# Patient Record
Sex: FEMALE | URBAN - METROPOLITAN AREA
[De-identification: names, ages, dates, MRNs, and addresses within clinical notes are randomized per-mention and may not be internally consistent; named-entity substitution may affect disease eponyms.]

---

## 2024-03-27 ENCOUNTER — APPOINTMENT (RX ONLY)
Dept: URBAN - METROPOLITAN AREA CLINIC 41 | Facility: CLINIC | Age: 29
Setting detail: DERMATOLOGY
End: 2024-03-27

## 2024-03-27 DIAGNOSIS — L74.51 PRIMARY FOCAL HYPERHIDROSIS: ICD-10-CM | Status: INADEQUATELY CONTROLLED

## 2024-03-27 DIAGNOSIS — Z41.9 ENCOUNTER FOR PROCEDURE FOR PURPOSES OTHER THAN REMEDYING HEALTH STATE, UNSPECIFIED: ICD-10-CM

## 2024-03-27 PROBLEM — L74.510 PRIMARY FOCAL HYPERHIDROSIS, AXILLA: Status: ACTIVE | Noted: 2024-03-27

## 2024-03-27 PROCEDURE — ? HYALURONIDASE INJECTION

## 2024-03-27 PROCEDURE — ? PRESCRIPTION MEDICATION MANAGEMENT

## 2024-03-27 PROCEDURE — ? BOTOX HYPERHIDROSIS

## 2024-03-27 PROCEDURE — ? COUNSELING

## 2024-03-27 PROCEDURE — ? ADDITIONAL NOTES

## 2024-03-27 PROCEDURE — ? COSMETIC CONSULTATION: FILLERS

## 2024-03-27 ASSESSMENT — LOCATION DETAILED DESCRIPTION DERM
LOCATION DETAILED: LEFT SUPERIOR VERMILION LIP
LOCATION DETAILED: LEFT AXILLARY VAULT
LOCATION DETAILED: RIGHT AXILLARY VAULT
LOCATION DETAILED: RIGHT SUPERIOR VERMILION LIP

## 2024-03-27 ASSESSMENT — LOCATION SIMPLE DESCRIPTION DERM
LOCATION SIMPLE: LEFT LIP
LOCATION SIMPLE: RIGHT AXILLARY VAULT
LOCATION SIMPLE: RIGHT LIP
LOCATION SIMPLE: LEFT AXILLARY VAULT

## 2024-03-27 ASSESSMENT — LOCATION ZONE DERM
LOCATION ZONE: AXILLAE
LOCATION ZONE: LIP

## 2024-03-27 NOTE — PROCEDURE: PRESCRIPTION MEDICATION MANAGEMENT
Samples Given: Qbrexa
Detail Level: Zone
Initiate Treatment: Qbrexa wipes
Render In Strict Bullet Format?: No
Plan: Try to get ins to cover Botox in future \\nConsider miradry in future

## 2024-03-27 NOTE — PROCEDURE: ADDITIONAL NOTES
Additional Notes: EM counsels that previous lip filler pt had done in the past was done incorrectly. EM notes that the previous tx has her lips looking crooked in comparison. EM counsels that pt should have excess filler injected with hyaluronidase and fixed before proceeding with tx. Pt agrees and would like the excess filler to be removed. EM counsels that hyaluronidase will take one week to set in. Pt will RTO in one week for further evaluation.
Detail Level: Detailed
Render Risk Assessment In Note?: no

## 2024-03-27 NOTE — PROCEDURE: HYALURONIDASE INJECTION
Administered By (Optional): Divya Garber
Detail Level: Detailed
Expiration Date (Optional): 09/2025
Total Volume (Ccs): 0.2
Lot # (Optional): [id number]
Hyaluronidase Preparation: hyaluronidase
Consent: The risks of contour defects and dimpling of the skin were reviewed with the patient prior to the injection.
Price (Use Numbers Only, No Special Characters Or $): 200
Treatment Number (Optional): 1

## 2024-03-27 NOTE — PROCEDURE: BOTOX HYPERHIDROSIS
Palms Units: 0
Topical Anesthesia: Ice
Expiration Date (Month Year): 03/2026
Dilution (U/0.1 Cc): 2
Post-Care Instructions: Patient instructed to not lie down for 4 hours and limit physical activity for 24 hours.
Consent: Written consent obtained. Risks include but not limited to muscle weakness, bruising, swelling, temporary effect, incomplete chemical denervation of sweat glands.
Axilla Units: 100
Lot #: [id number]
Detail Level: Detailed
Price (Use Numbers Only, No Special Characters Or $): 1200

## 2024-03-27 NOTE — HPI: SWEATING (HYPERHIDROSIS)
Is This A New Presentation, Or A Follow-Up?: Sweating
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 Patient saw a doctor once years ago and was given topical (possibly Drysol) which did not help.

## 2024-03-27 NOTE — PROCEDURE: COUNSELING
Medical Necessity Information: LCD Guidelines vary from payer to payer. Please check with your payer's policy to determine medical necessity.
Patient Specific Counseling (Will Not Stick From Patient To Patient): —\\nDK explains to patient that for hyperhidrosis under arms there are the most treatment options. DOMINGO explains these options include topicals, orals, Botox or miradry. DK explains the side effects and risks of each options. Patient notes she has tried something like Drysol in the past but this didn’t help.\\n\\nPatient is very eager to get this taken care of as soon as possible and not sure if she wants to wait for insurance approval. DOMINGO explains to patient that out of pocket the injections would be $1200. Patient opts for doing Botox today. DOMINGO counsels patient and makes sure understands this is temporary and we could just try topicals for now and wait until we get a response from insurance. DOMINGO makes sure patient is confident in her decision to do this today and patient says she is.
Detail Level: Detailed
Medical Necessity Clause: Botulinum toxin hyperhidrosis therapy is medically necessary because

## 2024-04-01 ENCOUNTER — APPOINTMENT (RX ONLY)
Dept: URBAN - METROPOLITAN AREA CLINIC 41 | Facility: CLINIC | Age: 29
Setting detail: DERMATOLOGY
End: 2024-04-01

## 2024-04-01 DIAGNOSIS — Z41.9 ENCOUNTER FOR PROCEDURE FOR PURPOSES OTHER THAN REMEDYING HEALTH STATE, UNSPECIFIED: ICD-10-CM

## 2024-04-01 PROCEDURE — ? FILLERS

## 2024-04-01 ASSESSMENT — LOCATION SIMPLE DESCRIPTION DERM
LOCATION SIMPLE: RIGHT LOWER LIP
LOCATION SIMPLE: LEFT LIP

## 2024-04-01 ASSESSMENT — LOCATION DETAILED DESCRIPTION DERM
LOCATION DETAILED: LEFT ORAL COMMISSURE
LOCATION DETAILED: RIGHT INFERIOR VERMILION BORDER
LOCATION DETAILED: LEFT SUPERIOR VERMILION LIP

## 2024-04-01 ASSESSMENT — LOCATION ZONE DERM: LOCATION ZONE: LIP

## 2024-04-01 NOTE — PROCEDURE: FILLERS
Additional Area 3 Volume In Cc: 0
Use Map Statement For Sites (Optional): No
Radiesse+ Syringe Price (Per 1.5 Cc- Numbers Only No Special Characters Or $): 0.00
Lot #: [id number]
Detail Level: Detailed
Pricing Information: This plan will add up the cumulative amount of filler you document and will bill based on the unit price noted below. For example if you inject 0.9 ccs of Restylane it will bill for one unit. If you inject 1.3 ccs it will bill for two units.
Filler: Juvederm Ultra XC
Expiration Date (Month Year): 08/31/2023
Additional Area 1 Location: chin
Post-Care Instructions: Patient instructed to apply ice to reduce swelling.
Lot #: [id number]
Pre-Treatment: Skin was cleaned with alcohol prior to injections.
Additional Anesthesia Volume In Cc: 6
Consent: Written consent obtained. Risks include but not limited to bruising, beading, irregular texture, ulceration, infection, allergic reaction, scar formation, incomplete augmentation, temporary nature, procedural pain.
Expiration Date (Month Year): 08/26/2024
Anesthesia Volume In Cc: 0.5
[place]s Avanza Ultra Xc Syringe Price (Per 1.0 Cc- Numbers Only No Special Characters Or $): 170
Lot #: [id number]
Anesthesia Type: 1% lidocaine with epinephrine
Vermilion Lips Filler Volume In Cc: 1
Map Statment: See Attach Map for Complete Details
Expiration Date (Month Year): 05/19/2024

## 2024-08-01 ENCOUNTER — APPOINTMENT (RX ONLY)
Dept: URBAN - METROPOLITAN AREA CLINIC 41 | Facility: CLINIC | Age: 29
Setting detail: DERMATOLOGY
End: 2024-08-01

## 2024-08-01 DIAGNOSIS — L74.51 PRIMARY FOCAL HYPERHIDROSIS: ICD-10-CM | Status: INADEQUATELY CONTROLLED

## 2024-08-01 PROBLEM — L74.510 PRIMARY FOCAL HYPERHIDROSIS, AXILLA: Status: ACTIVE | Noted: 2024-08-01

## 2024-08-01 PROCEDURE — ? COUNSELING

## 2024-08-01 PROCEDURE — 99214 OFFICE O/P EST MOD 30 MIN: CPT

## 2024-08-01 PROCEDURE — ? PRESCRIPTION MEDICATION MANAGEMENT

## 2024-08-01 ASSESSMENT — LOCATION ZONE DERM: LOCATION ZONE: AXILLAE

## 2024-08-01 ASSESSMENT — LOCATION SIMPLE DESCRIPTION DERM
LOCATION SIMPLE: LEFT AXILLARY VAULT
LOCATION SIMPLE: RIGHT AXILLARY VAULT

## 2024-08-01 ASSESSMENT — LOCATION DETAILED DESCRIPTION DERM
LOCATION DETAILED: LEFT AXILLARY VAULT
LOCATION DETAILED: RIGHT AXILLARY VAULT

## 2024-08-01 NOTE — PROCEDURE: PRESCRIPTION MEDICATION MANAGEMENT
Detail Level: Zone
Initiate Treatment: Botox pending authorization
Render In Strict Bullet Format?: No

## 2024-08-01 NOTE — PROCEDURE: COUNSELING
Medical Necessity Clause: Botulinum toxin hyperhidrosis therapy is medically necessary because
Detail Level: Detailed
Patient Specific Counseling (Will Not Stick From Patient To Patient): x\\nPt was treated by [facility] in March with 100 units Botox in the armpits, and wants insurance to cover it since tx was very helpful. BG counsels pt on getting another tx in sept,
Medical Necessity Information: LCD Guidelines vary from payer to payer. Please check with your payer's policy to determine medical necessity.

## 2024-08-02 ENCOUNTER — RX ONLY (OUTPATIENT)
Age: 29
Setting detail: RX ONLY
End: 2024-08-02

## 2024-08-02 RX ORDER — ONABOTULINUMTOXINA 100 [USP'U]/1
INJECTION, POWDER, LYOPHILIZED, FOR SOLUTION INTRADERMAL; INTRAMUSCULAR
Qty: 1 | Refills: 3 | Status: ERX | COMMUNITY
Start: 2024-08-02

## 2024-08-19 ENCOUNTER — APPOINTMENT (RX ONLY)
Dept: URBAN - METROPOLITAN AREA CLINIC 41 | Facility: CLINIC | Age: 29
Setting detail: DERMATOLOGY
End: 2024-08-19

## 2024-08-19 DIAGNOSIS — Z41.9 ENCOUNTER FOR PROCEDURE FOR PURPOSES OTHER THAN REMEDYING HEALTH STATE, UNSPECIFIED: ICD-10-CM

## 2024-08-19 PROCEDURE — ? FILLERS

## 2024-08-19 ASSESSMENT — LOCATION DETAILED DESCRIPTION DERM
LOCATION DETAILED: RIGHT UPPER CUTANEOUS LIP
LOCATION DETAILED: LEFT NASOLABIAL FOLD
LOCATION DETAILED: LEFT MEDIAL BUCCAL CHEEK
LOCATION DETAILED: RIGHT LOWER CUTANEOUS LIP
LOCATION DETAILED: LEFT INFERIOR MEDIAL MALAR CHEEK
LOCATION DETAILED: RIGHT INFERIOR MEDIAL MALAR CHEEK
LOCATION DETAILED: LEFT SUPERIOR MEDIAL BUCCAL CHEEK

## 2024-08-19 ASSESSMENT — LOCATION ZONE DERM
LOCATION ZONE: LIP
LOCATION ZONE: FACE

## 2024-08-19 ASSESSMENT — LOCATION SIMPLE DESCRIPTION DERM
LOCATION SIMPLE: RIGHT LIP
LOCATION SIMPLE: LEFT LIP
LOCATION SIMPLE: RIGHT CHEEK
LOCATION SIMPLE: LEFT CHEEK

## 2024-08-19 NOTE — PROCEDURE: FILLERS
Additional Area 3 Volume In Cc: 0
Use Map Statement For Sites (Optional): No
Lepe Override (Please Add The Total Price Without Any Special Characters Or $): 9205
Radiesse+ Syringe Price (Per 1.5 Cc- Numbers Only No Special Characters Or $): 0.00
Lot #: [id number]
Detail Level: Detailed
Pricing Information: This plan will add up the cumulative amount of filler you document and will bill based on the unit price noted below. For example if you inject 0.9 ccs of Restylane it will bill for one unit. If you inject 1.3 ccs it will bill for two units.
Filler: Juvederm Vollure XC
Expiration Date (Month Year): 08/31/2023
Additional Area 1 Location: chin
Post-Care Instructions: Patient instructed to apply ice to reduce swelling.
Lot #: [id number]
Pre-Treatment: Skin was cleaned with alcohol prior to injections.
Additional Anesthesia Volume In Cc: 6
Consent: Written consent obtained. Risks include but not limited to bruising, beading, irregular texture, ulceration, infection, allergic reaction, scar formation, incomplete augmentation, temporary nature, procedural pain.
Expiration Date (Month Year): 02/2025
Anesthesia Volume In Cc: 0.5
[employer] Ultra Xc Syringe Price (Per 1.0 Cc- Numbers Only No Special Characters Or $): 967
Lot #: [id number]
Anesthesia Type: 1% lidocaine with epinephrine
Marionette Lines Filler Volume In Cc: 1
Map Statment: See Attach Map for Complete Details
Expiration Date (Month Year): 05/19/2024

## 2024-08-19 NOTE — HPI: COSMETIC (FILLERS)
Additional History: Pt here for filler\\nEverything healed nicely \\nInterested in more in the lips, was also wondering else she could do with lips. Does not think she needs full syringe. \\Jess notes that would like  marionette lines